# Patient Record
Sex: FEMALE | Race: BLACK OR AFRICAN AMERICAN
[De-identification: names, ages, dates, MRNs, and addresses within clinical notes are randomized per-mention and may not be internally consistent; named-entity substitution may affect disease eponyms.]

---

## 2019-09-11 ENCOUNTER — APPOINTMENT (OUTPATIENT)
Dept: VASCULAR SURGERY | Facility: CLINIC | Age: 59
End: 2019-09-11
Payer: MEDICAID

## 2019-09-11 VITALS
HEART RATE: 68 BPM | WEIGHT: 182 LBS | HEIGHT: 68 IN | BODY MASS INDEX: 27.58 KG/M2 | SYSTOLIC BLOOD PRESSURE: 123 MMHG | TEMPERATURE: 97.9 F | DIASTOLIC BLOOD PRESSURE: 78 MMHG

## 2019-09-11 PROCEDURE — 93970 EXTREMITY STUDY: CPT

## 2019-09-11 PROCEDURE — 99203 OFFICE O/P NEW LOW 30 MIN: CPT

## 2019-09-11 NOTE — PHYSICAL EXAM
[de-identified] : On physical examination the patient is NAD, NCAT. HEENT- WNL. Neurologically intact. The lungs are clear and the heart has a regular rate and rhythm. Abdomen is benign. Bilateral femoral and pedal pulses are palpable bilaterally. Minimal bilateral ankle edema. No wounds present.\par A lower extremity venous duplex was performed in the office today which showed no evidence of DVT, SVT or venous insufficiency in either extremity. The left GSV was likely ablated previously.\par

## 2019-09-11 NOTE — ASSESSMENT
[FreeTextEntry1] : In summary, Mrs. Hawkins presents has no evidence of significant arterial or venous insufficiency and no contraindication to podiatric surgery from a vascular surgical perspective.\par \par Thank you for allowing me to participate in the care of this nice lady. Please do not hesitate to contact me with any questions. \par

## 2019-09-11 NOTE — HISTORY OF PRESENT ILLNESS
[FreeTextEntry1] : I have just had the pleasure of seeing Mrs. Chitra Hawkins in consultation for lower extremity arterial and venous insufficiency.\par \par Mrs. Hawkins is a pleasant 59-year-old lady with a history of venous insufficiency s/p intervention at an outside institution who presents for evaluation prior to podiatric surgery. She reports a history of bilateral leg edema. She denies any history of skin pigmentation changes, open or healed ulcers. She denies any history of claudication, rest pain, or tissue loss. She reports that she wears compression stockings in the winter and works as an  at an office. \par \par Her medical history is significant for hyperlipidemia, irritable bowel syndrome, fibroids, and GERD\par \par Medications: Vitamins\par \par Allergies: Penicillin, Bactrim\par \par Social history: Non-smoker\par \par FH: NC\par  \par